# Patient Record
(demographics unavailable — no encounter records)

---

## 2024-10-18 NOTE — HEALTH RISK ASSESSMENT
[Very Good] : ~his/her~  mood as very good [No] : In the past 12 months have you used drugs other than those required for medical reasons? No [Never] : Never [NO] : No [Patient reported mammogram was abnormal] : Patient reported mammogram was abnormal [Patient reported PAP Smear was normal] : Patient reported PAP Smear was normal [Patient reported bone density results were normal] : Patient reported bone density results were normal [Patient reported colonoscopy was abnormal] : Patient reported colonoscopy was abnormal [HIV Test offered] : HIV Test offered [Hepatitis C test offered] : Hepatitis C test offered [Alone] : lives alone [Retired] : retired [Single] : single [Feels Safe at Home] : Feels safe at home [Fully functional (bathing, dressing, toileting, transferring, walking, feeding)] : Fully functional (bathing, dressing, toileting, transferring, walking, feeding) [Fully functional (using the telephone, shopping, preparing meals, housekeeping, doing laundry, using] : Fully functional and needs no help or supervision to perform IADLs (using the telephone, shopping, preparing meals, housekeeping, doing laundry, using transportation, managing medications and managing finances) [No falls in past year] : Patient reported no falls in the past year [0] : 2) Feeling down, depressed, or hopeless: Not at all (0) [de-identified] : walks regularly -  [de-identified] : monitoring diet  [[id number]] : 0d [Change in mental status noted] : No change in mental status noted [MammogramDate] : 10/2023 [PapSmearComments] : no further at this time  [ColonoscopyDate] : 2018 [ColonoscopyComments] : mod- severe diverticular disease- 7-10 yrs  [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

## 2024-10-18 NOTE — HISTORY OF PRESENT ILLNESS
[de-identified] : 74-year-old female with a history of hyperlipidemia, hypothyroidism, hypertension, osteoarthritis of the hip presents today as a new patient to establish care  patient notes that she has a runny nose  has coughing dry periodically at night

## 2024-10-18 NOTE — PHYSICAL EXAM
[No Edema] : there was no peripheral edema [Normal] : no jugular venous distention, supple, no lymphadenopathy and the thyroid was normal and there were no nodules present

## 2024-10-18 NOTE — ASSESSMENT
[FreeTextEntry1] : 73-year-old female presenting today as a new patient to establish care  Hypertension -patient is currently taking amlodipine -Blood pressure  - left without repeat blood pressure patient will return to recheck   Hypothyroidism -Monitoring thyroid patient's most recent labs-February 2022 -Continue levothyroxine   Mammo ordered

## 2024-12-05 NOTE — ASSESSMENT
[FreeTextEntry1] : YOGESH 2/2 post infectious GN (Gp A Strep) - cr 0.9--> 2.0--> 1.2--> ?  Hosp records reviewed Biopsy results reviewed  check BMET, UPC and UA   f/u tbd by results of lab work

## 2024-12-05 NOTE — PHYSICAL EXAM
[General Appearance - Alert] : alert [General Appearance - In No Acute Distress] : in no acute distress [Sclera] : the sclera and conjunctiva were normal [Extraocular Movements] : extraocular movements were intact [Outer Ear] : the ears and nose were normal in appearance [Hearing Threshold Finger Rub Not [place]] : hearing was normal [Neck Appearance] : the appearance of the neck was normal [Exaggerated Use Of Accessory Muscles For Inspiration] : no accessory muscle use [Apical Impulse] : the apical impulse was normal [Heart Sounds] : normal S1 and S2 [Edema] : there was no peripheral edema [Veins - Varicosity Changes] : there were no varicosital changes [Bowel Sounds] : normal bowel sounds [Abdomen Soft] : soft [Abnormal Walk] : normal gait [Musculoskeletal - Swelling] : no joint swelling seen [Skin Color & Pigmentation] : normal skin color and pigmentation [] : no rash [Oriented To Time, Place, And Person] : oriented to person, place, and time [Impaired Insight] : insight and judgment were intact [Affect] : the affect was normal

## 2024-12-05 NOTE — HISTORY OF PRESENT ILLNESS
[FreeTextEntry1] : Pleasant 73 yo [place] female hospitalized for epidural abscess (Group A Strep), developed YOGESH ( Cr 0.9--> 2.0 with microhematuria and proteinuria) underwent renal biopsy which showed PIGN - cr was improving and by discharge was 1.2.  Remains on IV antibiotics via PICC and is accompanied by her daughter.

## 2024-12-05 NOTE — REASON FOR VISIT
[Follow-Up] : a follow-up visit [Post Hospitalization] : a post hospitalization visit [FreeTextEntry1] : f/u PIGN

## 2025-01-07 NOTE — HISTORY OF PRESENT ILLNESS
[Post-hospitalization from ___ Hospital] : Post-hospitalization from [unfilled] Hospital [Admitted on: ___] : The patient was admitted on [unfilled] [Discharged on ___] : discharged on [unfilled] [Discharge Summary] : discharge summary [Pertinent Labs] : pertinent labs [Radiology Findings] : radiology findings [Discharge Med List] : discharge medication list [Other: ____] : [unfilled] [Med Reconciliation] : medication reconciliation has been completed [Patient Contacted By: ____] : and contacted by [unfilled] [FreeTextEntry2] : 74-year-old female with a history of hypertension, hyperlipidemia, hypothyroidism, presents today for hospital follow-up Patient was admitted due to back pain radiating to the bilateral lower extremities and was found to have a collection of fluid around the lumbar spine requiring IR drainage Ultimately her admission was for strep a abscess of the lumbar spine Unclear of the etiology of the strep day Patient was seen by IR infectious disease and neurosurgery She was given ceftriaxone for 6 weeks and since then the back has been doing well with only minor discomfort but increasingly improving over time Her course was complicated by YOGESH secondary to postinfectious glomerulonephritis versus acute interstitial nephritis as a result of the antibiotics She did have a biopsy during this admission and she has followed up with nephrology There has been slow improvement in her kidney function she continues to have this monitored Anemia Patient has significant anemia which was thought to be secondary to bone marrow suppression from hematology as a result of the antibiotics Her most recent blood work 4 weeks ago did not show an improvement in her anemia

## 2025-01-07 NOTE — ASSESSMENT
[FreeTextEntry1] : Anemia Multifactorial Recommending repeat CBC with assessment of B12 and ferritin If there is no improvement in the anemia would recommend that this patient see hematology as this may be contributing to the YOGESH Patient is fairly asymptomatic  YOGESH secondary to AIN Continue follow-up with nephrology Recommending that the patient have an evaluation of the anemia as of course the YOGESH may be contributing to the anemia or vice versa  History of hypokalemia checking a potassium today  History of abscess of the lumbar spine Patient is doing well overall continue to monitor her condition

## 2025-01-15 NOTE — PHYSICAL EXAM
[General Appearance - Alert] : alert [General Appearance - In No Acute Distress] : in no acute distress [Sclera] : the sclera and conjunctiva were normal [Extraocular Movements] : extraocular movements were intact [Outer Ear] : the ears and nose were normal in appearance [Hearing Threshold Finger Rub Not [place]] : hearing was normal [Neck Appearance] : the appearance of the neck was normal [Exaggerated Use Of Accessory Muscles For Inspiration] : no accessory muscle use [Apical Impulse] : the apical impulse was normal [Heart Sounds] : normal S1 and S2 [Edema] : there was no peripheral edema [Veins - Varicosity Changes] : there were no varicosital changes [Bowel Sounds] : normal bowel sounds [Abdomen Soft] : soft [Abnormal Walk] : normal gait [Musculoskeletal - Swelling] : no joint swelling seen [Skin Color & Pigmentation] : normal skin color and pigmentation [] : no rash [Oriented To Time, Place, And Person] : oriented to person, place, and time [Impaired Insight] : insight and judgment were intact [Affect] : the affect was normal

## 2025-01-15 NOTE — HISTORY OF PRESENT ILLNESS
[FreeTextEntry1] : Pleasant 73 yo [place] female hospitalized for epidural abscess (Group A Strep), developed YOGESH ( Cr 0.9--> 2.0 with microhematuria and proteinuria) underwent renal biopsy which showed PIGN - cr was improving and by discharge was 1.2. has been fluctuating between  GFR of 47 immediately post hospitalization ( baseline prior to hospitalization was 0.9), since discharge has fluctuated between 1.2 and 1.88.  Volunteers she doesnt drink much water, but was admonished to do so.

## 2025-01-15 NOTE — ASSESSMENT
[FreeTextEntry1] : YOGESH 2/2 post infectious GN (Gp A Strep) - cr 0.9--> 2.0--> 1.2--> 1.88-1.49 since -  encourage increased water intake - stopped antibiotics in 12/30/2024  iron deficiency - d/w PCP, rec starting oral replacement, possibly IV iron infusion  htn -  cont amlodioine  HCM - check bone scan  Hosp records reviewed Biopsy results reviewed  recheck BMET, UPC and UA   f/u tbd by results of lab work

## 2025-02-27 NOTE — ASSESSMENT
[FreeTextEntry1] : YOGESH 2/2 post infectious GN (Gp A Strep) - cr 0.9--> 2.0--> 1.2--> 1.88-1.49 and as of 1/2025 - present 1.1/GFR 49 2/2025 -  encourage increased water intake -  stopped antibiotics in 12/30/2024  proteinuria  - improved from 12, 000 mg to 350mg iron deficiency - improved  htn -  cont amlodipine, avoid arb for now   Hosp records reviewed Biopsy results reviewed  recheck BMET, UPC and UA   f/u tbd by results of lab work

## 2025-02-27 NOTE — PHYSICAL EXAM
[General Appearance - Alert] : alert [General Appearance - In No Acute Distress] : in no acute distress [Sclera] : the sclera and conjunctiva were normal [Extraocular Movements] : extraocular movements were intact [Outer Ear] : the ears and nose were normal in appearance [Hearing Threshold Finger Rub Not [place]] : hearing was normal [Neck Appearance] : the appearance of the neck was normal [Exaggerated Use Of Accessory Muscles For Inspiration] : no accessory muscle use [Apical Impulse] : the apical impulse was normal [Heart Sounds] : normal S1 and S2 [Edema] : there was no peripheral edema [Veins - Varicosity Changes] : there were no varicosital changes [Bowel Sounds] : normal bowel sounds [Abdomen Soft] : soft [Abnormal Walk] : normal gait [Musculoskeletal - Swelling] : no joint swelling seen [Skin Color & Pigmentation] : normal skin color and pigmentation [] : no rash [Oriented To Time, Place, And Person] : oriented to person, place, and time [Impaired Insight] : insight and judgment were intact [Affect] : the affect was normal

## 2025-02-27 NOTE — HISTORY OF PRESENT ILLNESS
[FreeTextEntry1] : Pleasant 73 yo [place] female hospitalized for epidural abscess (Group A Strep), developed YOGESH ( Cr 0.9--> 2.0 with microhematuria and proteinuria) underwent renal biopsy which showed PIGN - cr was improving and by discharge was 1.2. has been fluctuating between  GFR of 47 immediately post hospitalization ( baseline prior to hospitalization was 0.9), since discharge has fluctuated between 1.2 and 1.88.  Volunteers she doesnt drink much water, but was admonished to do so.  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

## 2025-06-27 NOTE — HISTORY OF PRESENT ILLNESS
[FreeTextEntry1] : 75f hypothyroid, HTN, HLD, CKD, epidural abscess '24, presenting for colon cancer screening. Pt denies abdominal pain, rectal bleeding, change in bowel habits, or unexplained weight loss.    Last colonoscopy: [facility] - 2017 - diverticulosis - rec 7-10y  Sometimes constipated when has lots of rice.  Soc:  no tobacco or significant EtOH FHx: son w/ malignant colon polyp by description  ROS: Constitutional:: no weight loss, fevers ENT: no deafness Eyes: not blind Neck: no LN Chest: no dyspnea/cough Cardiac: no chest pain Vascular: no leg swelling GI: no abdominal pain, nausea, vomiting, diarrhea, constipation, rectal bleeding, dysphagia, melena unless otherwise noted in HPI : no dysuria, dark urine Skin: no rashes, jaundice Heme: no bleeding Endocrine: no DM unless otherwise stated in HPI  Px: (VS noted below) General: NAD Eyes: anicteric Oropharynx:  clear Neck: no LN Chest: normal respiratory effort CVS: regular Abd: soft, NT, ND, +BS, no HSM Ext: no atrophy Neuro: grossly nonfocal  Labs/imaging/prior endoscopic results reviewed to the extent available and noted in HPI

## 2025-06-27 NOTE — ASSESSMENT
[FreeTextEntry1] : Colon cancer screening - colonoscopy due - Colonoscopy scheduled - Risks, benefits, alternatives were discussed, including but not limited to bleeding, infection, perforation and sedation risks. Additionally, the possibility of missed lesions was conveyed.  Fhx noted as above - q5y is appropriate  Constipation - reviewed dietary and lifestyle modifications, including increased fluid, fiber intake, as well as habituation / following urge to defecate, cutting back on bread/pasta, and to consider starting fiber supplement (eg.,  psyllium)  PMD/consultation/hospital notes and Labs/imaging/prior endoscopic results reviewed to extent noted in HPI; and, if procedure code billed on this visit for lab draw, this serves to signify that labs were drawn here in this office. Pt also advised that any studies ordered, if prior authorization required it may take up to a week to confirm - and if pt has not heard RE: scheduling by a week, they should call this office.

## 2025-07-09 NOTE — HISTORY OF PRESENT ILLNESS
[FreeTextEntry1] : Pleasant 73 yo [place] female hospitalized for epidural abscess (Group A Strep), developed YOGESH ( Cr 0.9--> 2.0 with microhematuria and proteinuria) underwent renal biopsy which showed PIGN - cr was improving and by discharge was 1.2. has been fluctuating between  GFR of 47 immediately post hospitalization ( baseline prior to hospitalization was 0.9), since discharge has fluctuated between 1.2 and 1.88.  Volunteers she doesnt drink much water, but was admonished to do so.  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

## 2025-07-09 NOTE — PHYSICAL EXAM
[General Appearance - Alert] : alert [General Appearance - In No Acute Distress] : in no acute distress [Sclera] : the sclera and conjunctiva were normal [Extraocular Movements] : extraocular movements were intact [Outer Ear] : the ears and nose were normal in appearance [Hearing Threshold Finger Rub Not [place]] : hearing was normal [Neck Appearance] : the appearance of the neck was normal [Exaggerated Use Of Accessory Muscles For Inspiration] : no accessory muscle use [Apical Impulse] : the apical impulse was normal [Heart Sounds] : normal S1 and S2 [Edema] : there was no peripheral edema [Veins - Varicosity Changes] : there were no varicosital changes [Bowel Sounds] : normal bowel sounds [Abdomen Soft] : soft [Abnormal Walk] : normal gait [Musculoskeletal - Swelling] : no joint swelling seen [Skin Color & Pigmentation] : normal skin color and pigmentation [] : no rash [Oriented To Time, Place, And Person] : oriented to person, place, and time [Impaired Insight] : insight and judgment were intact [Affect] : the affect was normal

## 2025-07-17 NOTE — ASSESSMENT
[FreeTextEntry1] :  HTN - stable well controlled - continue current management - counseled on AHA guidelines for guidance regarding exercise (30-40min 3 times a week - recommend low salt diet - recommend medication compliance   HLD - continue current management - checking lipid - patinet to present while fasting  - avoiding excessive amounts of egg, whole milk, cheese, fried foods, butter.  Edema - likely 2/2 to amlodipine patient would like to continue to monitor for now  - consider elevation of legs and activity

## 2025-07-17 NOTE — HISTORY OF PRESENT ILLNESS
[FreeTextEntry8] : 75-year-old female with history of hypertension, hyperlipidemia, hypothyroidism, presents today for an acute exam  sometimes the feet get slightly swollen  - patient uses amlodipine  - she has only noticed mild swelling of the legs in the last 2 months intermittently  -   itchy eyes  - patient has no coughing, itching, edema, erythema,   cataracts  - patient has a cataract history  - she has not been able to see the ophthalmologist